# Patient Record
Sex: FEMALE | Race: WHITE | NOT HISPANIC OR LATINO | URBAN - METROPOLITAN AREA
[De-identification: names, ages, dates, MRNs, and addresses within clinical notes are randomized per-mention and may not be internally consistent; named-entity substitution may affect disease eponyms.]

---
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
## 2022-02-07 ENCOUNTER — HOSPITAL ENCOUNTER (OUTPATIENT)
Dept: MRI IMAGING | Age: 22
Discharge: HOME | End: 2022-02-07
Attending: PSYCHIATRY & NEUROLOGY
Payer: MEDICAID

## 2022-02-07 VITALS — BODY MASS INDEX: 23.05 KG/M2 | WEIGHT: 122 LBS

## 2022-02-07 DIAGNOSIS — R20.0 FACIAL NUMBNESS: ICD-10-CM

## 2022-02-07 DIAGNOSIS — G43.119 INTRACTABLE MIGRAINE WITH AURA WITHOUT STATUS MIGRAINOSUS: ICD-10-CM

## 2022-02-07 DIAGNOSIS — H53.9 VISUAL CHANGES: ICD-10-CM

## 2022-02-07 PROCEDURE — 70553 MRI BRAIN STEM W/O & W/DYE: CPT

## 2022-02-07 PROCEDURE — A9575 INJ GADOTERATE MEGLUMI 0.1ML: HCPCS | Performed by: RADIOLOGY

## 2022-02-07 PROCEDURE — 74011250636 HC RX REV CODE- 250/636: Performed by: RADIOLOGY

## 2022-02-07 RX ORDER — GADOTERATE MEGLUMINE 376.9 MG/ML
11 INJECTION INTRAVENOUS
Status: COMPLETED | OUTPATIENT
Start: 2022-02-07 | End: 2022-02-07

## 2022-02-07 RX ADMIN — GADOTERATE MEGLUMINE 11 ML: 376.9 INJECTION INTRAVENOUS at 18:16

## 2022-03-02 ENCOUNTER — TELEPHONE (OUTPATIENT)
Dept: NEUROLOGY | Age: 22
End: 2022-03-02

## 2022-03-08 ENCOUNTER — OFFICE VISIT (OUTPATIENT)
Dept: NEUROLOGY | Age: 22
End: 2022-03-08
Payer: MEDICAID

## 2022-03-08 VITALS
HEART RATE: 78 BPM | SYSTOLIC BLOOD PRESSURE: 118 MMHG | WEIGHT: 132 LBS | BODY MASS INDEX: 24.92 KG/M2 | RESPIRATION RATE: 16 BRPM | HEIGHT: 61 IN | DIASTOLIC BLOOD PRESSURE: 70 MMHG | OXYGEN SATURATION: 99 %

## 2022-03-08 DIAGNOSIS — G43.109 MIGRAINE WITH AURA AND WITHOUT STATUS MIGRAINOSUS, NOT INTRACTABLE: Primary | Chronic | ICD-10-CM

## 2022-03-08 PROCEDURE — 99214 OFFICE O/P EST MOD 30 MIN: CPT | Performed by: PSYCHIATRY & NEUROLOGY

## 2022-03-08 RX ORDER — NORTRIPTYLINE HYDROCHLORIDE 25 MG/1
25 CAPSULE ORAL
Qty: 90 CAPSULE | Refills: 0 | Status: SHIPPED | OUTPATIENT
Start: 2022-03-08 | End: 2022-06-06

## 2022-03-08 RX ORDER — SUMATRIPTAN 100 MG/1
TABLET, FILM COATED ORAL
Qty: 9 TABLET | Refills: 2 | Status: SHIPPED | OUTPATIENT
Start: 2022-03-08

## 2022-03-08 NOTE — PROGRESS NOTES
Chief Complaint   Patient presents with    Migraine     follow up, migrianes 25 days in the last month, sumitriptan takes the edge off      Visit Vitals  /70 (BP 1 Location: Left upper arm, BP Patient Position: Sitting)   Pulse 78   Resp 16   Ht 5' 1\" (1.549 m)   Wt 59.9 kg (132 lb)   SpO2 99%   BMI 24.94 kg/m²

## 2022-03-08 NOTE — PROGRESS NOTES
Claudia Lockwood (2000) is a 25 y.o. female, established patient, here for evaluation of the following     Chief complaint(s):   Chief Complaint   Patient presents with    Migraine     follow up, migrianes 25 days in the last month, Liliana Luo takes the edge off        SUBJECTIVE/ OBJECTIVE:    HPI: 25 y.o. female for chronic migraine     Last visit: 7-: Sent Rx Nortriptyline 10 mg QHS to use as headache preventive and Rx Sumatriptan 50 mg tablet to use up to 2 days a week for migraine pain reliever. Reprinted MRI Brain order and nurse gave to patient along with scheduling service #. Brain MRI done on 2-7-2022 was normal study    She reports trying Nortriptyline, denies any SEFx from it, but says it didn't reduce her headache frequency (was taking 10 mg QHS). 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 Pt says she's been reading on the Internet and most people / many people with this syndrome report using migraine preventives which seem to reduce the frequency of the visual symptoms. D/w her that this actually sounds like visual aura (migraine phenomena). Cannot use Topamax due to interaction with her hormonal contraception (reduced effectiveness of birth control) and increased risk of cleft lip/ cleft palate if she where to get pregnant while on topamax. Betablocker not a good choice due to low-normal BP. Did received 1st dose of Aimovig 70 mg SQ injection in office in June 2021 and says that significantly reduced her migraine frequency, but was not able to continue it as insurance required her to try \"antidepressant or anti-epileptic.  \"       ========================================    Brief Hx:      See initial visit dated 6-17-21 for full hx      No Known Allergies      Current Outpatient Medications:    nortriptyline (PAMELOR) 25 mg capsule, Take 1 Capsule by mouth nightly for 90 days. Antidepressant. To reduce headache frequency. , Disp: 90 Capsule, Rfl: 0    SUMAtriptan (IMITREX) 100 mg tablet, Take one tablet at onset of migraine. Limit use to 2 days per week., Disp: 9 Tablet, Rfl: 2    drospirenone-ethinyl estradioL (RADHA) 3-0.02 mg tab, Take 1 Tablet by mouth daily. , Disp: , Rfl:     clotrimazole (LOTRIMIN) 1 % topical cream, APPLY TO AFFECTED AREA TWICE A DAY (Patient not taking: Reported on 10/28/2021), Disp: , Rfl:      has no past medical history on file. has no past surgical history on file. Physical Exam:    Vitals:    03/08/22 1501   BP: 118/70   BP 1 Location: Left upper arm   BP Patient Position: Sitting   Pulse: 78   Resp: 16   Height: 5' 1\" (1.549 m)   Weight: 59.9 kg (132 lb)   SpO2: 99%     Awake alert, conversant, EOMI, visual fields normal  Moving all extremities w/o difficulty  Ambulates w/o difficulty    ========================================    ASSESSMENT/ PLAN:       ICD-10-CM ICD-9-CM    1. Migraine with aura and without status migrainosus, not intractable  G43.109 346.00 nortriptyline (PAMELOR) 25 mg capsule      SUMAtriptan (IMITREX) 100 mg tablet      Retry Nortriptyline but increase dose to 25 mg QHS, as migraine preventive    Increased strength of Sumatriptan from 50 mg to 100 mg tablet, to use up to 2 days a week prn migraine    Follow up around mid-May 2022        An electronic signature was used to authenticate this note.   -- Heather Cochran MD

## 2024-01-26 NOTE — TELEPHONE ENCOUNTER
Patient advised of CNM recommendations and prescription sent by CNM    Patient was scheduled for follow up on 10/18/2021 at 10:20am on 10/28/2021    Patient verbalized understanding. 8 to 22 minutes

## 2024-03-04 ENCOUNTER — OFFICE VISIT (OUTPATIENT)
Age: 24
End: 2024-03-04

## 2024-03-04 VITALS
SYSTOLIC BLOOD PRESSURE: 136 MMHG | TEMPERATURE: 98.6 F | HEIGHT: 61 IN | OXYGEN SATURATION: 99 % | DIASTOLIC BLOOD PRESSURE: 65 MMHG | RESPIRATION RATE: 16 BRPM | WEIGHT: 126.8 LBS | BODY MASS INDEX: 23.94 KG/M2 | HEART RATE: 83 BPM

## 2024-03-04 DIAGNOSIS — R51.9 ACUTE NONINTRACTABLE HEADACHE, UNSPECIFIED HEADACHE TYPE: ICD-10-CM

## 2024-03-04 DIAGNOSIS — R19.7 DIARRHEA, UNSPECIFIED TYPE: ICD-10-CM

## 2024-03-04 DIAGNOSIS — J02.9 SORE THROAT: Primary | ICD-10-CM

## 2024-03-04 LAB
GROUP A STREP ANTIGEN, POC: NEGATIVE
VALID INTERNAL CONTROL, POC: NORMAL

## 2024-03-04 RX ORDER — ONDANSETRON 4 MG/1
4 TABLET, ORALLY DISINTEGRATING ORAL EVERY 12 HOURS PRN
Qty: 10 TABLET | Refills: 0 | Status: SHIPPED | OUTPATIENT
Start: 2024-03-04 | End: 2024-03-09

## 2024-03-04 NOTE — PROGRESS NOTES
subdural hematoma for the patient's symptoms thus I consider the discharge disposition reasonable.  We have discussed the diagnosis and risks, and we agree with discharging home to follow up with their primary doctor.  We also discussed going to the ER immediately if new or worsening symptoms occur.  We have discussed the symptoms which are most concerning (eg., changing or worsening pain, weakness, vomiting, fever) that necessitates immediate ER visit.        I have discussed the results, diagnosis and treatment plan with the patient.  The patient also understands that early in the process of an illness, an urgent care workup can be falsely reassuring.  Routine discharge counseling and specific return precautions discussed with patient and the patient understands that worsening, changing or persistent symptoms should prompt an immediate return to the urgent care or emergency department.  Patient/Guardian expressed understanding and agrees with the discharge plan.  No further questions at time of discharge.    Carrol Olivares, APRN - CNP

## 2024-03-04 NOTE — PATIENT INSTRUCTIONS
Thank you for visiting [facility] today.    Avoid spicy, fatty or dairy foods until you're feeling better  Eat several small meals each day instead of 2-3 big ones  Drink plenty of water  Avoid ibuprofen or aspirin  Consider probiotics  Increase your fiber intake  Follow up with GI if no improvement in 5-7 days    If your pain worsens, you develop vomiting or uncontrollable fever of 100.4 or greater, please go to the ER.

## 2024-03-07 ASSESSMENT — ENCOUNTER SYMPTOMS
VOMITING: 1
DIARRHEA: 1
SORE THROAT: 1

## 2025-05-07 ENCOUNTER — OFFICE VISIT (OUTPATIENT)
Age: 25
End: 2025-05-07

## 2025-05-07 VITALS
HEART RATE: 79 BPM | OXYGEN SATURATION: 98 % | SYSTOLIC BLOOD PRESSURE: 107 MMHG | WEIGHT: 127 LBS | TEMPERATURE: 98.1 F | RESPIRATION RATE: 16 BRPM | BODY MASS INDEX: 24 KG/M2 | DIASTOLIC BLOOD PRESSURE: 62 MMHG

## 2025-05-07 DIAGNOSIS — A09 TRAVELER'S DIARRHEA: ICD-10-CM

## 2025-05-07 DIAGNOSIS — L30.9 DERMATITIS: Primary | ICD-10-CM

## 2025-05-07 DIAGNOSIS — K52.9 GASTROENTERITIS: ICD-10-CM

## 2025-05-07 DIAGNOSIS — J30.2 SEASONAL ALLERGIES: ICD-10-CM

## 2025-05-07 RX ORDER — TACROLIMUS 1 MG/G
1 OINTMENT TOPICAL 2 TIMES DAILY PRN
COMMUNITY

## 2025-05-07 NOTE — PROGRESS NOTES
Génesis Snyder (:  2000) is a 24 y.o. female,New patient, here for evaluation of the following chief complaint(s):  Other (Pt presents to clinic w/ C/o rash under eyes and scalp, wet cough, vomiting/diarrhea. Pt reports sx duration of 2 weeks for the cough, 3-4 days for rash, and 1 week of v/d. Reports OTC usage to include OTC topical ointment, decongestants and allergy meds.)        SUBJECTIVE/OBJECTIVE:    History provided by:  Patient       24 y.o. female presents with symptoms of rash around her eyes.  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  She states she cannot drink or go in the sun on the medication and has a cruise planned next week and is asking for an alternative so she can drink and be in the sun.  She has been withholding the medication a couple days already and the rash is returning underneath her eyes.  She states in a few days it will worsen.  She states he has tried reaching out to her dermatologist but because she has a new insurance they will not help her.  Gives history of extended topical steroid use prior to this diagnosis.    Also presents today complaining of allergy symptoms along with cough.  She is taking many over the counter allergy medicines and guaifenesin with no relief.  She denies any fevers or chills.  Cough is occasionally productive of yellow mucus.  And she states she will have a lot of nasal drainage and postnasal drip in the mornings.  No shortness of breath or chest pain.  No sinus pain or ear pain.    She also was traveling last week and states she came down with traveler's diarrhea last that is steadily improving.  She is eating and drinking normally.  No fevers or chills.  Occasional crampy abdominal pain though no pain currently.  She denies any blood in the stool though states she will see mucus but states that is normal for her because she has IBS.         Vitals:    25 1605   BP: 107/62   BP Site: Left

## 2025-05-07 NOTE — PATIENT INSTRUCTIONS
Thank you for visiting [facility] today    Dermatitis -  -Recommend reaching out to your dermatologist to request an alternative to the tacrolimus, or consider withholding the tacrolimus while on vacation  -Recommend always wearing sunscreen while in the sun at least SPF 40  -Recommend cleansing with mild soaps    Upper respiratory symptoms/seasonal allergies -  -Recommend taking a daily antihistamine such as Claritin, Allegra, or Zyrtec  -Recommend Sudafed/pseudoephedrine as needed  -Recommend daily use of a nasal steroid spray such as Flonase or Nasacort  -Can continue on expectorant, guaifenesin/Mucinex  -Recommend follow-up with your primary care provider  -Call or return to clinic if any concerns  -Please see below for additional symptomatic treatment recommendations:    Nasal Congestion:  Steroid nasal spray, such as Flonase, Nasonex, or Nasacort  Normal saline nasal spray  Afrin nasal spray no longer than three days  Oral decongestants, such as Sudafed/pseudoephedrine  Do not take if you have a history of high blood pressure, take Coricidin HBP (or the generic form) instead. Follow instructions on the box  Cough:  Throat lozenges, hot tea, and honey may help  Vicks VapoRub at night to help with cough and relieve muscles aches and pain  If not prescribed a cough medication, Delsym is an option. It is an over the counter cough medication containing dextromethorphan to help suppress cough at night   *Please only take when absolutely needed, as this is a controlled substance that can cause addiction   *Please only take cough syrup at nighttime as it causes drowsiness   *Do not drive or operate any machinery while taking this medication  Chest Congestion:  For thick mucus, take an expectorant (guaifenesin only) to help thin the mucus. Follow instructions on the box. You will need to drink plenty of water with this medication  Sore Throat:  Lozenges, as needed. Cepacol lozenges will help numb the